# Patient Record
Sex: FEMALE | Race: WHITE | NOT HISPANIC OR LATINO | URBAN - METROPOLITAN AREA
[De-identification: names, ages, dates, MRNs, and addresses within clinical notes are randomized per-mention and may not be internally consistent; named-entity substitution may affect disease eponyms.]

---

## 2017-04-28 ENCOUNTER — APPOINTMENT (OUTPATIENT)
Age: 37
Setting detail: DERMATOLOGY
End: 2017-04-29

## 2017-04-28 DIAGNOSIS — D22 MELANOCYTIC NEVI: ICD-10-CM

## 2017-04-28 DIAGNOSIS — Z71.89 OTHER SPECIFIED COUNSELING: ICD-10-CM

## 2017-04-28 DIAGNOSIS — L71.0 PERIORAL DERMATITIS: ICD-10-CM

## 2017-04-28 DIAGNOSIS — L57.8 OTHER SKIN CHANGES DUE TO CHRONIC EXPOSURE TO NONIONIZING RADIATION: ICD-10-CM

## 2017-04-28 PROBLEM — L30.9 DERMATITIS, UNSPECIFIED: Status: ACTIVE | Noted: 2017-04-28

## 2017-04-28 PROBLEM — D22.72 MELANOCYTIC NEVI OF LEFT LOWER LIMB, INCLUDING HIP: Status: ACTIVE | Noted: 2017-04-28

## 2017-04-28 PROBLEM — D22.5 MELANOCYTIC NEVI OF TRUNK: Status: ACTIVE | Noted: 2017-04-28

## 2017-04-28 PROCEDURE — OTHER PRESCRIPTION: OTHER

## 2017-04-28 PROCEDURE — OTHER COUNSELING: OTHER

## 2017-04-28 PROCEDURE — OTHER OTHER: OTHER

## 2017-04-28 PROCEDURE — 99214 OFFICE O/P EST MOD 30 MIN: CPT

## 2017-04-28 RX ORDER — DOXYCYCLINE 100 MG/1
CAPSULE ORAL
Qty: 60 | Refills: 0 | Status: ERX | COMMUNITY
Start: 2017-04-28

## 2017-04-28 ASSESSMENT — LOCATION DETAILED DESCRIPTION DERM
LOCATION DETAILED: LEFT ANTERIOR DISTAL THIGH
LOCATION DETAILED: LEFT PROXIMAL DORSAL FOREARM
LOCATION DETAILED: SUPERIOR THORACIC SPINE
LOCATION DETAILED: LEFT ANTERIOR PROXIMAL THIGH
LOCATION DETAILED: RIGHT UPPER CUTANEOUS LIP
LOCATION DETAILED: RIGHT DISTAL POSTERIOR THIGH
LOCATION DETAILED: RIGHT INFERIOR CENTRAL MALAR CHEEK
LOCATION DETAILED: LEFT SUPERIOR UPPER BACK
LOCATION DETAILED: LEFT MEDIAL SUPERIOR CHEST

## 2017-04-28 ASSESSMENT — LOCATION SIMPLE DESCRIPTION DERM
LOCATION SIMPLE: UPPER BACK
LOCATION SIMPLE: LEFT THIGH
LOCATION SIMPLE: RIGHT LIP
LOCATION SIMPLE: CHEST
LOCATION SIMPLE: LEFT UPPER BACK
LOCATION SIMPLE: LEFT FOREARM
LOCATION SIMPLE: RIGHT CHEEK
LOCATION SIMPLE: RIGHT POSTERIOR THIGH

## 2017-04-28 ASSESSMENT — LOCATION ZONE DERM
LOCATION ZONE: LEG
LOCATION ZONE: FACE
LOCATION ZONE: ARM
LOCATION ZONE: TRUNK
LOCATION ZONE: LIP

## 2017-04-28 NOTE — PROCEDURE: OTHER
Other (Free Text): SCALY PATCH L PERIALAR SKIN, FEW SMALL INFLAMMATORY PAPULES PERIORAL SKIN\\nF/U IF DOES NOT LEAD TO RESOLUTION
Note Text (......Xxx Chief Complaint.): This diagnosis correlates with the
Detail Level: Zone

## 2017-10-10 ENCOUNTER — APPOINTMENT (OUTPATIENT)
Age: 37
Setting detail: DERMATOLOGY
End: 2017-10-11

## 2017-10-10 DIAGNOSIS — L70.8 OTHER ACNE: ICD-10-CM

## 2017-10-10 DIAGNOSIS — D49.2 NEOPLASM OF UNSPECIFIED BEHAVIOR OF BONE, SOFT TISSUE, AND SKIN: ICD-10-CM

## 2017-10-10 PROCEDURE — 99213 OFFICE O/P EST LOW 20 MIN: CPT | Mod: 25

## 2017-10-10 PROCEDURE — OTHER OTHER: OTHER

## 2017-10-10 PROCEDURE — OTHER PRESCRIPTION: OTHER

## 2017-10-10 PROCEDURE — OTHER BIOPSY BY SHAVE METHOD: OTHER

## 2017-10-10 PROCEDURE — 11100: CPT

## 2017-10-10 PROCEDURE — OTHER COUNSELING: OTHER

## 2017-10-10 RX ORDER — SPIRONOLACTONE 100 MG/1
TABLET, FILM COATED ORAL
Qty: 180 | Refills: 1 | Status: ERX

## 2017-10-10 RX ORDER — DAPSONE 75 MG/G
GEL TOPICAL
Qty: 1 | Refills: 3 | Status: ERX | COMMUNITY
Start: 2017-10-10

## 2017-10-10 RX ORDER — SPIRONOLACTONE 100 MG/1
TABLET, FILM COATED ORAL
Qty: 30 | Refills: 3 | Status: ERX | COMMUNITY
Start: 2017-10-10

## 2017-10-10 ASSESSMENT — LOCATION DETAILED DESCRIPTION DERM
LOCATION DETAILED: LEFT MEDIAL KNEE
LOCATION DETAILED: LEFT CHIN

## 2017-10-10 ASSESSMENT — LOCATION ZONE DERM
LOCATION ZONE: LEG
LOCATION ZONE: FACE

## 2017-10-10 ASSESSMENT — LOCATION SIMPLE DESCRIPTION DERM
LOCATION SIMPLE: LEFT KNEE
LOCATION SIMPLE: CHIN

## 2017-10-10 NOTE — PROCEDURE: BIOPSY BY SHAVE METHOD
Additional Anesthesia Volume In Cc (Will Not Render If 0): 0
Cryotherapy Text: The wound bed was treated with cryotherapy after the biopsy was performed.
Detail Level: Detailed
Biopsy Method: Double edge Personna blades
Bill For Surgical Tray: no
Post-Care Instructions: I reviewed with the patient in detail post-care instructions. Patient is to keep the biopsy site dry overnight, and then apply bacitracin twice daily until healed. Patient may apply hydrogen peroxide soaks to remove any crusting.
Curettage Text: The wound bed was treated with curettage after the biopsy was performed.
Biopsy Type: H and E
Consent: Written consent was obtained and risks were reviewed including but not limited to scarring, infection, bleeding, scabbing, incomplete removal, nerve damage and allergy to anesthesia.
Electrodesiccation Text: The wound bed was treated with electrodesiccation after the biopsy was performed.
Type Of Destruction Used: Electrodesiccation and Curettage
Anesthesia Volume In Cc (Will Not Render If 0): 0.3
Render Post-Care Instructions In Note?: yes
Billing Type: Third-Party Bill
Hemostasis: Electrocautery and Aluminum Chloride
Anesthesia Type: 2% lidocaine with epinephrine
Dressing: no dressing applied
Silver Nitrate Text: The wound bed was treated with silver nitrate after the biopsy was performed.
Size Of Lesion In Cm: 0.4
Notification Instructions: Patient will be notified of biopsy results. However, patient instructed to call the office if not contacted within 2 weeks.
Electrodesiccation And Curettage Text: The wound bed was treated with electrodesiccation and curettage after the biopsy was performed.
Wound Care: Vaseline

## 2017-10-10 NOTE — PROCEDURE: OTHER
Detail Level: Zone
Note Text (......Xxx Chief Complaint.): This diagnosis correlates with the
Other (Free Text): PT WILL TAKE 2 TABS MARQUES QAM\\nSTOPPED OCP FEW MO AGO BUT REPORTS NO PLANS FOR MORE CHILDREN - HAS 2\\nWILL USE CONDOMS

## 2018-05-11 ENCOUNTER — APPOINTMENT (OUTPATIENT)
Age: 38
Setting detail: DERMATOLOGY
End: 2018-06-11

## 2018-05-11 DIAGNOSIS — I83.89 VARICOSE VEINS OF LOWER EXTREMITIES WITH OTHER COMPLICATIONS: ICD-10-CM

## 2018-05-11 PROBLEM — I83.893 VARICOSE VEINS OF BILATERAL LOWER EXTREMITIES WITH OTHER COMPLICATIONS: Status: ACTIVE | Noted: 2018-05-11

## 2018-05-11 PROCEDURE — 99212 OFFICE O/P EST SF 10 MIN: CPT | Mod: 25

## 2018-05-11 PROCEDURE — OTHER DOPPLER US: OTHER

## 2018-05-11 PROCEDURE — 93970 EXTREMITY STUDY: CPT

## 2018-05-11 ASSESSMENT — LOCATION DETAILED DESCRIPTION DERM
LOCATION DETAILED: LEFT ANTERIOR PROXIMAL THIGH
LOCATION DETAILED: RIGHT ANTERIOR DISTAL THIGH

## 2018-05-11 ASSESSMENT — LOCATION ZONE DERM: LOCATION ZONE: LEG

## 2018-05-11 ASSESSMENT — LOCATION SIMPLE DESCRIPTION DERM
LOCATION SIMPLE: LEFT THIGH
LOCATION SIMPLE: RIGHT THIGH

## 2018-05-11 NOTE — HPI: OTHER
Condition:: Ultrasound of lower extremities
Please Describe Your Condition:: Patient has been complaining of aching, fatigue, swelling and heavy sensation in legs for the past 9 years. She is now here to have an ultrasound done of the bilateral lower extremities.

## 2018-06-13 ENCOUNTER — APPOINTMENT (OUTPATIENT)
Age: 38
Setting detail: DERMATOLOGY
End: 2018-06-27

## 2018-06-13 DIAGNOSIS — I83.89 VARICOSE VEINS OF LOWER EXTREMITIES WITH OTHER COMPLICATIONS: ICD-10-CM

## 2018-06-13 PROBLEM — I83.893 VARICOSE VEINS OF BILATERAL LOWER EXTREMITIES WITH OTHER COMPLICATIONS: Status: ACTIVE | Noted: 2018-06-13

## 2018-06-13 PROCEDURE — 99213 OFFICE O/P EST LOW 20 MIN: CPT

## 2018-06-13 PROCEDURE — OTHER COUNSELING: OTHER

## 2018-06-13 PROCEDURE — OTHER SEPARATE AND IDENTIFIABLE DOCUMENTATION: OTHER

## 2018-06-13 PROCEDURE — OTHER CEAP-C CLASSIFICATION: OTHER

## 2018-06-13 PROCEDURE — OTHER EDUCATIONAL RESOURCES PROVIDED: OTHER

## 2018-06-13 ASSESSMENT — LOCATION ZONE DERM: LOCATION ZONE: LEG

## 2018-06-13 ASSESSMENT — LOCATION SIMPLE DESCRIPTION DERM
LOCATION SIMPLE: RIGHT THIGH
LOCATION SIMPLE: LEFT THIGH

## 2018-06-13 ASSESSMENT — LOCATION DETAILED DESCRIPTION DERM
LOCATION DETAILED: LEFT ANTERIOR PROXIMAL THIGH
LOCATION DETAILED: RIGHT ANTERIOR DISTAL THIGH

## 2018-06-13 NOTE — PROCEDURE: CEAP-C CLASSIFICATION
Detail Level: Zone
Right Leg Circumference: small
Right Dorsalis Pedis Pulse: 2 (Easily palpable)
Right Leg: Peripheral Vascular Disease?: No
Follow Up Instructions:: Bilateral duplex ultrasound venous reflux study was reviewed with the results and finalized a treatment plan. Preventive strategies include weight loss through diet and exercise and toning leg muscles. A venous fact sheet was given, which reviews venous anatomy/pathophysiology and treatment options. The pathophysiology of venous disease and potential treatment options were discussed in detail, especially the non-FDA status of foam sclerotherapy with its risks benefits and alternatives. The patient's questions were answered in full

## 2018-09-14 ENCOUNTER — APPOINTMENT (OUTPATIENT)
Age: 38
Setting detail: DERMATOLOGY
End: 2018-09-21

## 2018-09-14 DIAGNOSIS — I83.89 VARICOSE VEINS OF LOWER EXTREMITIES WITH OTHER COMPLICATIONS: ICD-10-CM

## 2018-09-14 PROBLEM — I83.893 VARICOSE VEINS OF BILATERAL LOWER EXTREMITIES WITH OTHER COMPLICATIONS: Status: ACTIVE | Noted: 2018-09-14

## 2018-09-14 PROCEDURE — OTHER COUNSELING: OTHER

## 2018-09-14 PROCEDURE — OTHER CEAP-C CLASSIFICATION: OTHER

## 2018-09-14 PROCEDURE — OTHER EDUCATIONAL RESOURCES PROVIDED: OTHER

## 2018-09-14 PROCEDURE — OTHER SEPARATE AND IDENTIFIABLE DOCUMENTATION: OTHER

## 2018-09-14 PROCEDURE — 99214 OFFICE O/P EST MOD 30 MIN: CPT

## 2018-09-14 ASSESSMENT — LOCATION DETAILED DESCRIPTION DERM
LOCATION DETAILED: LEFT ANTERIOR PROXIMAL THIGH
LOCATION DETAILED: RIGHT ANTERIOR DISTAL THIGH

## 2018-09-14 ASSESSMENT — LOCATION ZONE DERM: LOCATION ZONE: LEG

## 2018-09-14 ASSESSMENT — LOCATION SIMPLE DESCRIPTION DERM
LOCATION SIMPLE: RIGHT THIGH
LOCATION SIMPLE: LEFT THIGH

## 2018-11-16 ENCOUNTER — APPOINTMENT (OUTPATIENT)
Age: 38
Setting detail: DERMATOLOGY
End: 2018-12-29

## 2018-11-16 DIAGNOSIS — I83.89 VARICOSE VEINS OF LOWER EXTREMITIES WITH OTHER COMPLICATIONS: ICD-10-CM

## 2018-11-16 PROBLEM — I83.892 VARICOSE VEINS OF LEFT LOWER EXTREMITY WITH OTHER COMPLICATIONS: Status: ACTIVE | Noted: 2018-11-16

## 2018-11-16 PROCEDURE — 36475 ENDOVENOUS RF 1ST VEIN: CPT

## 2018-11-16 PROCEDURE — OTHER ENDOVENOUS ABLATION: OTHER

## 2018-11-16 ASSESSMENT — LOCATION SIMPLE DESCRIPTION DERM: LOCATION SIMPLE: LEFT PRETIBIAL REGION

## 2018-11-16 ASSESSMENT — LOCATION ZONE DERM: LOCATION ZONE: LEG

## 2018-11-16 ASSESSMENT — LOCATION DETAILED DESCRIPTION DERM: LOCATION DETAILED: LEFT PROXIMAL PRETIBIAL REGION

## 2018-11-16 NOTE — PROCEDURE: ENDOVENOUS ABLATION
Rf Cycles: 8
Detail Level: Detailed
Medical Necessity Information: LCD Guidelines vary from payer to payer. Please check with your payer's policy to determine medical necessity.
Consent was obtained with risks, benefits, and alternatives discussed for the above procedures. Preoperative medications were taken as above.
Tumescent Anesthesia Volume In Cc: 250
Medical Necessity Clause: This therapy was medically necessary because the patient has
Estimated Blood Loss (Cc): minimal
Body Location Override (Optional - Billing Will Still Be Based On Selected Body Map Location): left lower leg
Disposition: Compression dressings were placed. Wound care instructions were given, verbal and written.\\n\\nPost-treatment Instructions:\\n• You may experience pain and inflammation, if you are not allergic or sensitive, please take Advil, Motrin or Ibuprofen 600mg 3 times daily with food.\\n• Use the prescription pain medicine if needed.\\n• You may feel a “tight” or pulling sensation or firm “cord” in your leg for several weeks. Massaging your legs will help. This feeling will go away with time.\\n• Walk but avoid prolonged standing or sitting. Elevate your legs as much as possible for the first three (3) days after your treatment.\\n• You may resume normal activity after three (3) days and engage in heavy exercise ten (10) days after your treatment.\\n• Remove wrap after 48 hours, leaving the steri-strip tape in place. At that time, you may shower. Do not be concerned if the steri-strip comes off.\\n• Wear compression stockings for two (2) weeks, removing them at bedtime. \\n• If your have taken a sedative (valium) during your treatment, we advise you to arrange for transportation home after your treatment.\\n• If you have any questions or concerns after your treatment, contact our office immediately. In the event of a life-threatening emergency, contact 911.
Hemostasis: Pressure
Rf Time (Include Units): 2:40
Number Of Incisions Per Microphlebectomy: 0

## 2018-12-12 ENCOUNTER — APPOINTMENT (OUTPATIENT)
Age: 38
Setting detail: DERMATOLOGY
End: 2018-12-29

## 2018-12-12 DIAGNOSIS — I83.89 VARICOSE VEINS OF LOWER EXTREMITIES WITH OTHER COMPLICATIONS: ICD-10-CM

## 2018-12-12 PROBLEM — I83.893 VARICOSE VEINS OF BILATERAL LOWER EXTREMITIES WITH OTHER COMPLICATIONS: Status: ACTIVE | Noted: 2018-12-12

## 2018-12-12 PROCEDURE — OTHER DOPPLER US: OTHER

## 2018-12-12 PROCEDURE — OTHER ENDOVENOUS ABLATION: OTHER

## 2018-12-12 PROCEDURE — 36475 ENDOVENOUS RF 1ST VEIN: CPT

## 2018-12-12 PROCEDURE — 93971 EXTREMITY STUDY: CPT | Mod: 59

## 2018-12-12 ASSESSMENT — LOCATION SIMPLE DESCRIPTION DERM
LOCATION SIMPLE: RIGHT THIGH
LOCATION SIMPLE: LEFT THIGH

## 2018-12-12 ASSESSMENT — LOCATION DETAILED DESCRIPTION DERM
LOCATION DETAILED: RIGHT ANTERIOR DISTAL THIGH
LOCATION DETAILED: LEFT ANTERIOR PROXIMAL THIGH

## 2018-12-12 ASSESSMENT — LOCATION ZONE DERM: LOCATION ZONE: LEG

## 2018-12-12 NOTE — PROCEDURE: ENDOVENOUS ABLATION
Hemostasis: Pressure
Disposition: Compression dressings were placed. Wound care instructions were given, verbal and written.\\n\\nPost-treatment Instructions:\\n• You may experience pain and inflammation, if you are not allergic or sensitive, please take Advil, Motrin or Ibuprofen 600mg 3 times daily with food.\\n• Use the prescription pain medicine if needed.\\n• You may feel a “tight” or pulling sensation or firm “cord” in your leg for several weeks. Massaging your legs will help. This feeling will go away with time.\\n• Walk but avoid prolonged standing or sitting. Elevate your legs as much as possible for the first three (3) days after your treatment.\\n• You may resume normal activity after three (3) days and engage in heavy exercise ten (10) days after your treatment.\\n• Remove wrap after 48 hours, leaving the steri-strip tape in place. At that time, you may shower. Do not be concerned if the steri-strip comes off.\\n• Wear compression stockings for two (2) weeks, removing them at bedtime. \\n• If your have taken a sedative (valium) during your treatment, we advise you to arrange for transportation home after your treatment.\\n• If you have any questions or concerns after your treatment, contact our office immediately. In the event of a life-threatening emergency, contact 911.
Detail Level: Detailed
Medical Necessity Information: LCD Guidelines vary from payer to payer. Please check with your payer's policy to determine medical necessity.
Rf Cycles: 4
Medical Necessity Clause: This therapy was medically necessary because the patient has
Consent was obtained with risks, benefits, and alternatives discussed for the above procedures. Preoperative medications were taken as above.
Rf Time (Include Units): 1:20
Tumescent Anesthesia Volume In Cc: 200
Body Location Override (Optional - Billing Will Still Be Based On Selected Body Map Location): right leg
Estimated Blood Loss (Cc): minimal
Number Of Incisions Per Microphlebectomy: 0

## 2019-01-11 ENCOUNTER — APPOINTMENT (OUTPATIENT)
Age: 39
Setting detail: DERMATOLOGY
End: 2019-02-19

## 2019-01-11 DIAGNOSIS — I83.89 VARICOSE VEINS OF LOWER EXTREMITIES WITH OTHER COMPLICATIONS: ICD-10-CM

## 2019-01-11 PROBLEM — I83.891 VARICOSE VEINS OF RIGHT LOWER EXTREMITY WITH OTHER COMPLICATIONS: Status: ACTIVE | Noted: 2019-01-11

## 2019-01-11 PROCEDURE — OTHER DOPPLER US: OTHER

## 2019-01-11 PROCEDURE — 99212 OFFICE O/P EST SF 10 MIN: CPT | Mod: 25

## 2019-01-11 PROCEDURE — 93971 EXTREMITY STUDY: CPT

## 2019-01-11 ASSESSMENT — LOCATION DETAILED DESCRIPTION DERM: LOCATION DETAILED: RIGHT PROXIMAL PRETIBIAL REGION

## 2019-01-11 ASSESSMENT — LOCATION ZONE DERM: LOCATION ZONE: LEG

## 2019-01-11 ASSESSMENT — LOCATION SIMPLE DESCRIPTION DERM: LOCATION SIMPLE: RIGHT PRETIBIAL REGION

## 2019-04-19 ENCOUNTER — APPOINTMENT (OUTPATIENT)
Age: 39
Setting detail: DERMATOLOGY
End: 2019-04-30

## 2019-04-19 DIAGNOSIS — I83.89 VARICOSE VEINS OF LOWER EXTREMITIES WITH OTHER COMPLICATIONS: ICD-10-CM

## 2019-04-19 PROBLEM — I83.893 VARICOSE VEINS OF BILATERAL LOWER EXTREMITIES WITH OTHER COMPLICATIONS: Status: ACTIVE | Noted: 2019-04-19

## 2019-04-19 PROCEDURE — 93970 EXTREMITY STUDY: CPT

## 2019-04-19 PROCEDURE — OTHER DOPPLER US: OTHER

## 2019-04-19 PROCEDURE — OTHER COUNSELING: OTHER

## 2019-04-19 PROCEDURE — OTHER SEPARATE AND IDENTIFIABLE DOCUMENTATION: OTHER

## 2019-04-19 PROCEDURE — 99214 OFFICE O/P EST MOD 30 MIN: CPT | Mod: 25

## 2019-04-19 PROCEDURE — OTHER CEAP-C CLASSIFICATION: OTHER

## 2019-04-19 ASSESSMENT — LOCATION SIMPLE DESCRIPTION DERM
LOCATION SIMPLE: RIGHT PRETIBIAL REGION
LOCATION SIMPLE: RIGHT THIGH
LOCATION SIMPLE: LEFT THIGH
LOCATION SIMPLE: LEFT PRETIBIAL REGION

## 2019-04-19 ASSESSMENT — LOCATION DETAILED DESCRIPTION DERM
LOCATION DETAILED: LEFT LATERAL PROXIMAL PRETIBIAL REGION
LOCATION DETAILED: RIGHT PROXIMAL PRETIBIAL REGION
LOCATION DETAILED: RIGHT ANTERIOR DISTAL THIGH
LOCATION DETAILED: LEFT ANTERIOR PROXIMAL THIGH

## 2019-04-19 ASSESSMENT — LOCATION ZONE DERM: LOCATION ZONE: LEG

## 2019-04-19 NOTE — PROCEDURE: CEAP-C CLASSIFICATION
Right Dorsalis Pedis Pulse: 2 (Easily palpable)
Follow Up Instructions:: Bilateral duplex ultrasound venous reflux study was reviewed with the results and finalized a treatment plan. Preventive strategies include weight loss through diet and exercise and toning leg muscles. A venous fact sheet was given, which reviews venous anatomy/pathophysiology and treatment options. The pathophysiology of venous disease and potential treatment options were discussed in detail, especially the non-FDA status of foam sclerotherapy with its risks benefits and alternatives. The patient's questions were answered in full
Right Leg: Peripheral Vascular Disease?: No
Detail Level: Zone
Right Leg Circumference: small

## 2019-11-07 NOTE — PROCEDURE: COUNSELING
Detail Level: Zone
Detail Level: Simple
Detail Level: Generalized
Closure 4 Information: This tab is for additional flaps and grafts above and beyond our usual structured repairs.  Please note if you enter information here it will not currently bill and you will need to add the billing information manually.

## 2021-12-09 NOTE — PROCEDURE: CEAP-C CLASSIFICATION
Dr. Byrnes was called, patients daughter refusing to take her mother home. Daughter states \"mom is not ready  To go\" Bed order was requested per PA.  Patient and family was instructed that there may be a long wait for a bed. Daughter stated \"we\"ll wait Im not taking her home.  
Left Dorsalis Pedis Pulse: 2 (Easily palpable)
Follow Up Instructions:: Bilateral duplex ultrasound venous reflux study was reviewed with the results and finalized a treatment plan. Preventive strategies include weight loss through diet and exercise and toning leg muscles. A venous fact sheet was given, which reviews venous anatomy/pathophysiology and treatment options. The pathophysiology of venous disease and potential treatment options were discussed in detail, especially the non-FDA status of foam sclerotherapy with its risks benefits and alternatives. The patient's questions were answered in full
Right Leg: Peripheral Vascular Disease?: No
Right Leg Circumference: small
Detail Level: Zone